# Patient Record
Sex: FEMALE | Race: WHITE
[De-identification: names, ages, dates, MRNs, and addresses within clinical notes are randomized per-mention and may not be internally consistent; named-entity substitution may affect disease eponyms.]

---

## 2019-08-14 ENCOUNTER — HOSPITAL ENCOUNTER (EMERGENCY)
Dept: HOSPITAL 11 - JP.ED | Age: 29
Discharge: NURSING, REHAB OR LONG-TERM CARE | End: 2019-08-14
Payer: MEDICAID

## 2019-08-14 VITALS — HEART RATE: 113 BPM | SYSTOLIC BLOOD PRESSURE: 124 MMHG | DIASTOLIC BLOOD PRESSURE: 69 MMHG

## 2019-08-14 DIAGNOSIS — F15.129: Primary | ICD-10-CM

## 2019-08-14 PROCEDURE — 93005 ELECTROCARDIOGRAM TRACING: CPT

## 2019-08-14 PROCEDURE — 84100 ASSAY OF PHOSPHORUS: CPT

## 2019-08-14 PROCEDURE — 83690 ASSAY OF LIPASE: CPT

## 2019-08-14 PROCEDURE — G0480 DRUG TEST DEF 1-7 CLASSES: HCPCS

## 2019-08-14 PROCEDURE — 31500 INSERT EMERGENCY AIRWAY: CPT

## 2019-08-14 PROCEDURE — 83605 ASSAY OF LACTIC ACID: CPT

## 2019-08-14 PROCEDURE — 80053 COMPREHEN METABOLIC PANEL: CPT

## 2019-08-14 PROCEDURE — 80305 DRUG TEST PRSMV DIR OPT OBS: CPT

## 2019-08-14 PROCEDURE — 71045 X-RAY EXAM CHEST 1 VIEW: CPT

## 2019-08-14 PROCEDURE — 85025 COMPLETE CBC W/AUTO DIFF WBC: CPT

## 2019-08-14 PROCEDURE — 85610 PROTHROMBIN TIME: CPT

## 2019-08-14 PROCEDURE — 84484 ASSAY OF TROPONIN QUANT: CPT

## 2019-08-14 PROCEDURE — 51702 INSERT TEMP BLADDER CATH: CPT

## 2019-08-14 PROCEDURE — 96365 THER/PROPH/DIAG IV INF INIT: CPT

## 2019-08-14 PROCEDURE — 96372 THER/PROPH/DIAG INJ SC/IM: CPT

## 2019-08-14 PROCEDURE — 36415 COLL VENOUS BLD VENIPUNCTURE: CPT

## 2019-08-14 PROCEDURE — 81025 URINE PREGNANCY TEST: CPT

## 2019-08-14 PROCEDURE — 83735 ASSAY OF MAGNESIUM: CPT

## 2019-08-14 PROCEDURE — 81001 URINALYSIS AUTO W/SCOPE: CPT

## 2019-08-14 PROCEDURE — 99285 EMERGENCY DEPT VISIT HI MDM: CPT

## 2019-08-14 PROCEDURE — 96361 HYDRATE IV INFUSION ADD-ON: CPT

## 2019-08-14 NOTE — ANES
DATE OF SERVICE:  08/14/2019

 

I was called to the emergency room to evaluate Ms. Mckeon for an intubation.  She is here

for an apparent overdose and needs airway protection.  I did go ahead and give her 200 mg of

propofol, and the emergency room doctor performed the intubation with a GlideScope with an

8.0 endotracheal tube.  She tolerated the procedure very nicely.  She is quite obtunded,

although she could not open her mouth without the Diprivan.

 

 

 

 

Anand Fernandes CRNA

DD:  08/14/2019 02:44:42

DT:  08/14/2019 03:13:25

Job #:  [id number]

## 2019-08-14 NOTE — EDM.PDOC
ED HPI GENERAL MEDICAL PROBLEM





- General


Chief Complaint: General


Stated Complaint: MEDICAL


Time Seen by Provider: 08/14/19 00:05


Source of Information: Reports: Other (Boyfriend)


History Limitations: Reports: Altered Mental Status





- History of Present Illness


INITIAL COMMENTS - FREE TEXT/NARRATIVE: 





28 years old female patient brought in by her partner for altered mental 

status. He stated that she was fine when he went to bed then a few hours later 

woke up because of out of noise and she was running around in the house 

thrashing and stumbling and thinks. He brought her for evaluation. She said she 

drinks some alcohol but denies using drugs. He is not sure what had been. He 

stated that she doesn't use drugs. No report of any trauma or head injury. No 

report of any chest pain or shortness breath. She is agitated, intoxicated and 

not able to review her system or collect clear history from her.





- Related Data


 Allergies











Allergy/AdvReac Type Severity Reaction Status Date / Time


 


No Known Allergies Allergy   Verified 06/29/15 23:59











Home Meds: 


 Home Meds





. [Unable to Verify Home Med List]  08/14/19 [History]











Past Medical History





- Past Health History


Medical/Surgical History: Denies Medical/Surgical History


Other OB/GYN History: cervical conization.  fiberous breasts





Social & Family History





- Living Situation & Occupation


Living situation: Reports: Single, with Significant Other


Occupation: Employed





ED ROS GENERAL





- Review of Systems


Review Of Systems: Unable To Obtain





ED EXAM, GENERAL





- Physical Exam


Exam: See Below


Exam Limited By: Altered Mental Status


General Appearance: Other (Agitated and combative)


Eye Exam: Bilateral Eye: EOMI


Ears: Normal External Exam, Normal Canal, Hearing Grossly Normal, Normal TMs


Ear Exam: Bilateral Ear: Auricle Normal, Canal Normal, TM normal


Nose: Normal Inspection, Normal Mucosa, No Blood


Throat/Mouth: Normal Inspection, Normal Lips, Normal Teeth, Normal Gums, Normal 

Oropharynx, Normal Voice, No Airway Compromise


Head: Atraumatic, Normocephalic


Respiratory/Chest: No Respiratory Distress, Lungs Clear, Normal Breath Sounds, 

No Accessory Muscle Use, Chest Non-Tender.  No: Crackles, Rales, Rhonchi, 

Wheezing


Cardiovascular: Normal Peripheral Pulses, Tachycardia.  No: Diastolic Murmur, 

Systolic Murmur


GI/Abdominal: Normal Bowel Sounds, Soft, Non-Tender, No Organomegaly, No 

Distention, No Abnormal Bruit, No Mass


Back Exam: Normal Inspection, Full Range of Motion, NT


Neurological: Inattentive, Disoriented


Skin Exam: Diaphoretic





Course





- Vital Signs


Last Recorded V/S: 


 Last Vital Signs











Temp  36.4 C   08/14/19 02:48


 


Pulse  114 H  08/14/19 02:48


 


Resp  15   08/14/19 02:48


 


BP  123/79   08/14/19 02:48


 


Pulse Ox  100   08/14/19 02:48














- Orders/Labs/Meds


Orders: 


 Active Orders 24 hr











 Category Date Time Status


 


 Cardiac Monitoring [RC] .As Directed Care  08/14/19 00:40 Active


 


 Cardiac Monitoring [RC] .As Directed Care  08/14/19 02:13 Active


 


 EKG Documentation Completion [RC] ASDIRECTED Care  08/14/19 02:13 Active


 


 RASS Sedation Scale [RC] ASDIRECTED Care  08/14/19 02:44 Active


 


 Propofol [Diprivan 100 ML] 100 ml Med  08/14/19 02:45 Active





 IV TITRATE   


 


 Desired Level of Sedation (RASS) [AST] Click to Edit Oth  08/14/19 02:44 

Ordered


 


 EKG 12 Lead [EK] Stat Ther  08/14/19 02:13 Ordered








 Medication Orders





Propofol (Diprivan 100 Ml)  100 mls @ 2.177 mls/hr IV TITRATE TOSIN; Protocol


   Last Titration: 08/14/19 03:06  Dose: 45 mcg/kg/min, 19.595 mls/hr


   Titration: 08/14/19 03:00  Dose: 35 mcg/kg/min, 15.241 mls/hr


   Titration: 08/14/19 02:54  Dose: 25 mcg/kg/min, 10.886 mls/hr


   Admin: 08/14/19 02:50  Dose: 20 mcg/kg/min, 8.709 mls/hr








Labs: 


 Laboratory Tests











  08/14/19 08/14/19 08/14/19 Range/Units





  00:40 00:40 00:40 


 


WBC  8.6    (4.5-11.0)  K/uL


 


RBC  4.58    (3.30-5.50)  M/uL


 


Hgb  14.3    (12.0-15.0)  g/dL


 


Hct  43.2    (36.0-48.0)  %


 


MCV  94    (80-98)  fL


 


MCH  31    (27-31)  pg


 


MCHC  33    (32-36)  %


 


Plt Count  252    (150-400)  K/uL


 


Neut % (Auto)  71 H    (36-66)  %


 


Lymph % (Auto)  17 L    (24-44)  %


 


Mono % (Auto)  10 H    (2-6)  %


 


Eos % (Auto)  1 L    (2-4)  %


 


Baso % (Auto)  1    (0-1)  %


 


PT   10.8   (9.5-12.0)  sec


 


INR   1.00   (0.80-1.20)  


 


Sodium    139 L  (140-148)  mmol/L


 


Potassium    4.0  (3.6-5.2)  mmol/L


 


Chloride    102  (100-108)  mmol/L


 


Carbon Dioxide    26  (21-32)  mmol/L


 


Anion Gap    15.0 H  (5.0-14.0)  mmol/L


 


BUN    15  (7-18)  mg/dL


 


Creatinine    1.0  (0.6-1.0)  mg/dL


 


Est Cr Clr Drug Dosing    82.98  mL/min


 


Estimated GFR (MDRD)    > 60  (>60)  


 


Glucose    118 H  ()  mg/dL


 


Lactic Acid     (0.4-2.0)  mmol/L


 


Calcium    9.1  (8.5-10.1)  mg/dL


 


Phosphorus    4.3  (2.5-4.9)  mg/dL


 


Magnesium    1.6 L  (1.8-2.4)  mg/dL


 


Total Bilirubin    0.8  (0.2-1.0)  mg/dL


 


AST    18  (15-37)  U/L


 


ALT    27  (12-78)  U/L


 


Alkaline Phosphatase    61  ()  U/L


 


Troponin I    < 0.017  (0.000-0.056)  ng/mL


 


Total Protein    7.9  (6.4-8.2)  g/dL


 


Albumin    4.3  (3.4-5.0)  g/dL


 


Globulin    3.6 H  (2.3-3.5)  g/dL


 


Albumin/Globulin Ratio    1.2  (1.2-2.2)  


 


Lipase    84  ()  U/L


 


Urine Color     (YELLOW)  


 


Urine Appearance     (CLEAR)  


 


Urine pH     (5.0-8.0)  


 


Ur Specific Gravity     (1.008-1.030)  


 


Urine Protein     (NEGATIVE)  mg/dL


 


Urine Glucose (UA)     (NEGATIVE)  mg/dL


 


Urine Ketones     (NEGATIVE)  mg/dL


 


Urine Occult Blood     (NEGATIVE)  


 


Urine Nitrite     (NEGATIVE)  


 


Urine Bilirubin     (NEGATIVE)  


 


Urine Urobilinogen     (0.2-1.0)  EU/dL


 


Ur Leukocyte Esterase     (NEGATIVE)  


 


Urine RBC     (0-5)  


 


Urine WBC     (0-5)  


 


Ur Epithelial Cells     


 


Amorphous Sediment     


 


Urine Bacteria     


 


Urine Mucus     


 


Urine HCG, Qual     


 


Urine Opiates Screen     (NEGATIVE)  


 


Ur Oxycodone Screen     (NEGATIVE)  


 


Urine Methadone Screen     (NEGATIVE)  


 


Ur Propoxyphene Screen     (NEGATIVE)  


 


Ur Barbiturates Screen     (NEGATIVE)  


 


Ur Tricyclics Screen     (NEGATIVE)  


 


Ur Phencyclidine Scrn     (NEGATIVE)  


 


Ur Amphetamine Screen     (NEGATIVE)  


 


U Methamphetamines Scrn     (NEGATIVE)  


 


Urine MDMA Screen     (NEGATIVE)  


 


U Benzodiazepines Scrn     (NEGATIVE)  


 


U Cocaine Metab Screen     (NEGATIVE)  


 


U Marijuana (THC) Screen     (NEGATIVE)  


 


Ethyl Alcohol     mg/dL














  08/14/19 08/14/19 08/14/19 Range/Units





  00:40 00:40 01:30 


 


WBC     (4.5-11.0)  K/uL


 


RBC     (3.30-5.50)  M/uL


 


Hgb     (12.0-15.0)  g/dL


 


Hct     (36.0-48.0)  %


 


MCV     (80-98)  fL


 


MCH     (27-31)  pg


 


MCHC     (32-36)  %


 


Plt Count     (150-400)  K/uL


 


Neut % (Auto)     (36-66)  %


 


Lymph % (Auto)     (24-44)  %


 


Mono % (Auto)     (2-6)  %


 


Eos % (Auto)     (2-4)  %


 


Baso % (Auto)     (0-1)  %


 


PT     (9.5-12.0)  sec


 


INR     (0.80-1.20)  


 


Sodium     (140-148)  mmol/L


 


Potassium     (3.6-5.2)  mmol/L


 


Chloride     (100-108)  mmol/L


 


Carbon Dioxide     (21-32)  mmol/L


 


Anion Gap     (5.0-14.0)  mmol/L


 


BUN     (7-18)  mg/dL


 


Creatinine     (0.6-1.0)  mg/dL


 


Est Cr Clr Drug Dosing     mL/min


 


Estimated GFR (MDRD)     (>60)  


 


Glucose     ()  mg/dL


 


Lactic Acid  1.9    (0.4-2.0)  mmol/L


 


Calcium     (8.5-10.1)  mg/dL


 


Phosphorus     (2.5-4.9)  mg/dL


 


Magnesium     (1.8-2.4)  mg/dL


 


Total Bilirubin     (0.2-1.0)  mg/dL


 


AST     (15-37)  U/L


 


ALT     (12-78)  U/L


 


Alkaline Phosphatase     ()  U/L


 


Troponin I     (0.000-0.056)  ng/mL


 


Total Protein     (6.4-8.2)  g/dL


 


Albumin     (3.4-5.0)  g/dL


 


Globulin     (2.3-3.5)  g/dL


 


Albumin/Globulin Ratio     (1.2-2.2)  


 


Lipase     ()  U/L


 


Urine Color    Yellow  (YELLOW)  


 


Urine Appearance    Clear  (CLEAR)  


 


Urine pH    6.0  (5.0-8.0)  


 


Ur Specific Gravity    1.030  (1.008-1.030)  


 


Urine Protein    Trace H  (NEGATIVE)  mg/dL


 


Urine Glucose (UA)    Normal  (NEGATIVE)  mg/dL


 


Urine Ketones    Negative  (NEGATIVE)  mg/dL


 


Urine Occult Blood    Negative  (NEGATIVE)  


 


Urine Nitrite    Negative  (NEGATIVE)  


 


Urine Bilirubin    Negative  (NEGATIVE)  


 


Urine Urobilinogen    Normal  (0.2-1.0)  EU/dL


 


Ur Leukocyte Esterase    Negative  (NEGATIVE)  


 


Urine RBC    0-5  (0-5)  


 


Urine WBC    0-5  (0-5)  


 


Ur Epithelial Cells    Few  


 


Amorphous Sediment    Few  


 


Urine Bacteria    Few  


 


Urine Mucus    Numerous  


 


Urine HCG, Qual     


 


Urine Opiates Screen     (NEGATIVE)  


 


Ur Oxycodone Screen     (NEGATIVE)  


 


Urine Methadone Screen     (NEGATIVE)  


 


Ur Propoxyphene Screen     (NEGATIVE)  


 


Ur Barbiturates Screen     (NEGATIVE)  


 


Ur Tricyclics Screen     (NEGATIVE)  


 


Ur Phencyclidine Scrn     (NEGATIVE)  


 


Ur Amphetamine Screen     (NEGATIVE)  


 


U Methamphetamines Scrn     (NEGATIVE)  


 


Urine MDMA Screen     (NEGATIVE)  


 


U Benzodiazepines Scrn     (NEGATIVE)  


 


U Cocaine Metab Screen     (NEGATIVE)  


 


U Marijuana (THC) Screen     (NEGATIVE)  


 


Ethyl Alcohol   < 3   mg/dL














  08/14/19 08/14/19 Range/Units





  01:30 01:30 


 


WBC    (4.5-11.0)  K/uL


 


RBC    (3.30-5.50)  M/uL


 


Hgb    (12.0-15.0)  g/dL


 


Hct    (36.0-48.0)  %


 


MCV    (80-98)  fL


 


MCH    (27-31)  pg


 


MCHC    (32-36)  %


 


Plt Count    (150-400)  K/uL


 


Neut % (Auto)    (36-66)  %


 


Lymph % (Auto)    (24-44)  %


 


Mono % (Auto)    (2-6)  %


 


Eos % (Auto)    (2-4)  %


 


Baso % (Auto)    (0-1)  %


 


PT    (9.5-12.0)  sec


 


INR    (0.80-1.20)  


 


Sodium    (140-148)  mmol/L


 


Potassium    (3.6-5.2)  mmol/L


 


Chloride    (100-108)  mmol/L


 


Carbon Dioxide    (21-32)  mmol/L


 


Anion Gap    (5.0-14.0)  mmol/L


 


BUN    (7-18)  mg/dL


 


Creatinine    (0.6-1.0)  mg/dL


 


Est Cr Clr Drug Dosing    mL/min


 


Estimated GFR (MDRD)    (>60)  


 


Glucose    ()  mg/dL


 


Lactic Acid    (0.4-2.0)  mmol/L


 


Calcium    (8.5-10.1)  mg/dL


 


Phosphorus    (2.5-4.9)  mg/dL


 


Magnesium    (1.8-2.4)  mg/dL


 


Total Bilirubin    (0.2-1.0)  mg/dL


 


AST    (15-37)  U/L


 


ALT    (12-78)  U/L


 


Alkaline Phosphatase    ()  U/L


 


Troponin I    (0.000-0.056)  ng/mL


 


Total Protein    (6.4-8.2)  g/dL


 


Albumin    (3.4-5.0)  g/dL


 


Globulin    (2.3-3.5)  g/dL


 


Albumin/Globulin Ratio    (1.2-2.2)  


 


Lipase    ()  U/L


 


Urine Color    (YELLOW)  


 


Urine Appearance    (CLEAR)  


 


Urine pH    (5.0-8.0)  


 


Ur Specific Gravity    (1.008-1.030)  


 


Urine Protein    (NEGATIVE)  mg/dL


 


Urine Glucose (UA)    (NEGATIVE)  mg/dL


 


Urine Ketones    (NEGATIVE)  mg/dL


 


Urine Occult Blood    (NEGATIVE)  


 


Urine Nitrite    (NEGATIVE)  


 


Urine Bilirubin    (NEGATIVE)  


 


Urine Urobilinogen    (0.2-1.0)  EU/dL


 


Ur Leukocyte Esterase    (NEGATIVE)  


 


Urine RBC    (0-5)  


 


Urine WBC    (0-5)  


 


Ur Epithelial Cells    


 


Amorphous Sediment    


 


Urine Bacteria    


 


Urine Mucus    


 


Urine HCG, Qual  Negative   


 


Urine Opiates Screen   Negative  (NEGATIVE)  


 


Ur Oxycodone Screen   Negative  (NEGATIVE)  


 


Urine Methadone Screen   Negative  (NEGATIVE)  


 


Ur Propoxyphene Screen   Negative  (NEGATIVE)  


 


Ur Barbiturates Screen   Negative  (NEGATIVE)  


 


Ur Tricyclics Screen   Negative  (NEGATIVE)  


 


Ur Phencyclidine Scrn   Negative  (NEGATIVE)  


 


Ur Amphetamine Screen   Presumptive positive H  (NEGATIVE)  


 


U Methamphetamines Scrn   Presumptive positive H  (NEGATIVE)  


 


Urine MDMA Screen   Presumptive positive H  (NEGATIVE)  


 


U Benzodiazepines Scrn   Negative  (NEGATIVE)  


 


U Cocaine Metab Screen   Negative  (NEGATIVE)  


 


U Marijuana (THC) Screen   Presumptive positive H  (NEGATIVE)  


 


Ethyl Alcohol    mg/dL











Meds: 


Medications











Generic Name Dose Route Start Last Admin





  Trade Name Freq  PRN Reason Stop Dose Admin


 


Propofol  100 mls @ 2.177 mls/hr  08/14/19 02:45  08/14/19 03:06





  Diprivan 100 Ml  IV   45 mcg/kg/min





  TITRATE TOSIN   19.595 mls/hr





     Titration





     





  Protocol   





  5 MCG/KG/MIN   














Discontinued Medications














Generic Name Dose Route Start Last Admin





  Trade Name Freq  PRN Reason Stop Dose Admin


 


Sodium Chloride  1,000 mls @ 999 mls/hr  08/14/19 00:42  08/14/19 00:50





  Normal Saline  IV  08/14/19 01:42  999 mls/hr





  .BOLUS STA   Administration





     





     





     





     


 


Propofol  Confirm  08/14/19 02:42  08/14/19 02:51





  Diprivan 100 Ml  Administered  08/14/19 02:43  Not Given





  Dose   





  100 mls @ as directed   





  .ROUTE   





  .STK-MED ONE   





     





     





     





     


 


Ketamine HCl  350 mg  08/14/19 00:37  08/14/19 01:34





  Ketalar  IM  08/14/19 00:38  350 mg





  ONETIME ONE   Administration





     





     





     





     


 


Ketamine HCl  Confirm  08/14/19 00:37  08/14/19 02:51





  Ketalar  Administered  08/14/19 00:38  Not Given





  Dose   





  500 mg   





  .ROUTE   





  .STK-MED ONE   





     





     





     





     


 


Ketamine HCl  250 mg  08/14/19 01:10  08/14/19 01:34





  Ketalar  IM  08/14/19 01:11  150 mg





  ONETIME ONE   Administration





     





     





     





     


 


Propofol  Confirm  08/14/19 02:47  





  Diprivan  20 Ml  Administered  08/14/19 02:48  





  Dose   





  200 mg   





  .ROUTE   





  .STK-MED ONE   





     





     





     





     














- Radiology Interpretation


Free Text/Narrative:: 





Patient was seen and examined immediately on arrival. She appears intoxicated 

very agitated and combative. Was given 500 IM ketamine, lab reviewed. Her urine 

shows positive for amphetamine, methamphetamine, marijuana, MDMA. Given 2 L 

normal saline bolus. EKG shows sinus tachycardia. Blood pressure has been 

stable. Continued to be combative and agitated. Decision was made to intubate 

her. We do not have ICU beds. I did consulted with ICU physician intensivist 

Dr. Krunal fish of [place] and she accepted the transfer for 

further management. she is currently currently intubated and sedated. 

ambulance. Here and she is related ago so CT head was not done not to delay 

transfer. Stable for transfer.





Departure





- Departure


Time of Disposition: 02:25


Disposition: DC/Tfer to [facility]


Condition: Good, Fair


Clinical Impression: 


 Methamphetamine intoxication








- Discharge Information


Referrals: 


PCP,None [Primary Care Provider] - 


Forms:  ED Department Discharge





- My Orders


Last 24 Hours: 


My Active Orders





08/14/19 00:40


Cardiac Monitoring [RC] .As Directed 





08/14/19 02:13


Cardiac Monitoring [RC] .As Directed 


EKG Documentation Completion [RC] ASDIRECTED 


EKG 12 Lead [EK] Stat 





08/14/19 02:44


RASS Sedation Scale [RC] ASDIRECTED 


Desired Level of Sedation (RASS) [AST] Click to Edit 





08/14/19 02:45


Propofol [Diprivan 100 ML] 100 ml IV TITRATE 














- Assessment/Plan


Last 24 Hours: 


My Active Orders





08/14/19 00:40


Cardiac Monitoring [RC] .As Directed 





08/14/19 02:13


Cardiac Monitoring [RC] .As Directed 


EKG Documentation Completion [RC] ASDIRECTED 


EKG 12 Lead [EK] Stat 





08/14/19 02:44


RASS Sedation Scale [RC] ASDIRECTED 


Desired Level of Sedation (RASS) [AST] Click to Edit 





08/14/19 02:45


Propofol [Diprivan 100 ML] 100 ml IV TITRATE 











Plan: 





Transfer to [place]

## 2019-08-14 NOTE — CRLCR
Indication:



Intubated



Technique:



Chest 1 view



Comparison:



None



Findings/Impression:



Endotracheal tube tips terminates 3.9 cm above the level of the sherwin. 

Normal cardiomediastinal silhouette. Lungs and pleural spaces are clear. No 

acute osseous abnormality.



Dictated by Suzy Betancourt MD @ Aug 14 2019  3:03AM



Signed by Dr. Suzy Betancourt @ Aug 14 2019  3:03AM

## 2019-11-22 ENCOUNTER — HOSPITAL ENCOUNTER (OUTPATIENT)
Dept: HOSPITAL 11 - JP.SDS | Age: 29
Discharge: HOME | End: 2019-11-22
Attending: SURGERY
Payer: MEDICAID

## 2019-11-22 VITALS — HEART RATE: 97 BPM | SYSTOLIC BLOOD PRESSURE: 123 MMHG | DIASTOLIC BLOOD PRESSURE: 65 MMHG

## 2019-11-22 DIAGNOSIS — Z53.9: ICD-10-CM

## 2019-11-22 DIAGNOSIS — K81.1: Primary | ICD-10-CM

## 2019-11-22 PROCEDURE — 80305 DRUG TEST PRSMV DIR OPT OBS: CPT

## 2022-03-22 ENCOUNTER — HOSPITAL ENCOUNTER (EMERGENCY)
Dept: HOSPITAL 11 - JP.ED | Age: 32
Discharge: HOME | End: 2022-03-22
Payer: MEDICAID

## 2022-03-22 VITALS — SYSTOLIC BLOOD PRESSURE: 139 MMHG | HEART RATE: 101 BPM | DIASTOLIC BLOOD PRESSURE: 79 MMHG

## 2022-03-22 DIAGNOSIS — R07.89: Primary | ICD-10-CM

## 2023-03-05 ENCOUNTER — HOSPITAL ENCOUNTER (EMERGENCY)
Dept: HOSPITAL 11 - JP.ED | Age: 33
Discharge: HOME | End: 2023-03-05
Payer: MEDICAID

## 2023-03-05 VITALS — HEART RATE: 100 BPM | DIASTOLIC BLOOD PRESSURE: 87 MMHG | SYSTOLIC BLOOD PRESSURE: 154 MMHG

## 2023-03-05 DIAGNOSIS — F41.0: Primary | ICD-10-CM

## 2023-03-05 DIAGNOSIS — Z72.0: ICD-10-CM

## 2023-03-05 LAB — TROPONIN I SERPL HS-MCNC: 8.2 PG/ML (ref ?–60.3)

## 2023-03-05 PROCEDURE — 85025 COMPLETE CBC W/AUTO DIFF WBC: CPT

## 2023-03-05 PROCEDURE — 93005 ELECTROCARDIOGRAM TRACING: CPT

## 2023-03-05 PROCEDURE — 96372 THER/PROPH/DIAG INJ SC/IM: CPT

## 2023-03-05 PROCEDURE — 83605 ASSAY OF LACTIC ACID: CPT

## 2023-03-05 PROCEDURE — 80053 COMPREHEN METABOLIC PANEL: CPT

## 2023-03-05 PROCEDURE — 99285 EMERGENCY DEPT VISIT HI MDM: CPT

## 2023-03-05 PROCEDURE — 36415 COLL VENOUS BLD VENIPUNCTURE: CPT

## 2023-03-05 PROCEDURE — 71046 X-RAY EXAM CHEST 2 VIEWS: CPT

## 2023-03-05 PROCEDURE — 84484 ASSAY OF TROPONIN QUANT: CPT

## 2023-04-03 ENCOUNTER — HOSPITAL ENCOUNTER (EMERGENCY)
Dept: HOSPITAL 11 - JP.ED | Age: 33
Discharge: HOME | End: 2023-04-03
Payer: MEDICAID

## 2023-04-03 VITALS — DIASTOLIC BLOOD PRESSURE: 76 MMHG | SYSTOLIC BLOOD PRESSURE: 130 MMHG | HEART RATE: 108 BPM

## 2023-04-03 DIAGNOSIS — F17.210: ICD-10-CM

## 2023-04-03 DIAGNOSIS — U07.1: Primary | ICD-10-CM

## 2023-04-03 LAB — SARS-COV-2 RNA RESP QL NAA+PROBE: POSITIVE

## 2023-04-03 PROCEDURE — 81001 URINALYSIS AUTO W/SCOPE: CPT

## 2023-04-03 PROCEDURE — 36415 COLL VENOUS BLD VENIPUNCTURE: CPT

## 2023-04-03 PROCEDURE — 96372 THER/PROPH/DIAG INJ SC/IM: CPT

## 2023-04-03 PROCEDURE — 80053 COMPREHEN METABOLIC PANEL: CPT

## 2023-04-03 PROCEDURE — 86140 C-REACTIVE PROTEIN: CPT

## 2023-04-03 PROCEDURE — 85025 COMPLETE CBC W/AUTO DIFF WBC: CPT

## 2023-04-03 PROCEDURE — 99283 EMERGENCY DEPT VISIT LOW MDM: CPT

## 2023-04-03 PROCEDURE — 0241U: CPT

## 2023-05-09 ENCOUNTER — HOSPITAL ENCOUNTER (EMERGENCY)
Dept: HOSPITAL 11 - JP.ED | Age: 33
Discharge: HOME | End: 2023-05-09
Payer: MEDICAID

## 2023-05-09 VITALS — DIASTOLIC BLOOD PRESSURE: 88 MMHG | HEART RATE: 110 BPM | SYSTOLIC BLOOD PRESSURE: 144 MMHG

## 2023-05-09 DIAGNOSIS — M76.61: Primary | ICD-10-CM

## 2023-05-09 DIAGNOSIS — Z72.0: ICD-10-CM

## 2023-05-09 LAB
BASOPHILS # BLD AUTO: 0.08 K/UL (ref 0–0.1)
BASOPHILS NFR BLD AUTO: 0.8 % (ref 0.1–1.3)
EOSINOPHIL # BLD AUTO: 0.21 K/UL (ref 0–0.4)
EOSINOPHIL NFR BLD AUTO: 2 % (ref 0–5.4)
HCT VFR BLD AUTO: 39.4 % (ref 34.3–46)
HGB BLD-MCNC: 13 G/DL (ref 11.2–15.5)
IMM GRANULOCYTES # BLD: 0.04 K/UL (ref 0–0.23)
IMM GRANULOCYTES NFR BLD: 0.4 % (ref 0–0.7)
LYMPHOCYTES # BLD AUTO: 1.27 K/UL (ref 0.8–3.3)
LYMPHOCYTES NFR BLD AUTO: 12.1 % (ref 11.4–47.7)
MCH RBC QN AUTO: 31.6 PG (ref 31.6–35.5)
MCHC RBC AUTO-ENTMCNC: 33 G/DL (ref 31.6–35.5)
MCHC RBC AUTO-ENTMCNC: 95.6 FL (ref 81.4–99)
MONOCYTES # BLD AUTO: 0.99 K/UL (ref 0.2–0.9)
MONOCYTES NFR BLD AUTO: 9.5 % (ref 3.3–12.6)
NEUTROPHILS # BLD AUTO: 7.88 K/UL (ref 1–7.6)
NEUTROPHILS NFR BLD AUTO: 75.2 % (ref 40–78.1)
PLATELET # BLD AUTO: 244 K/UL (ref 130–375)
RBC # BLD AUTO: 4.12 M/UL (ref 3.77–5.24)
WBC # BLD AUTO: 10.5 K/UL (ref 3.2–11)

## 2023-05-09 PROCEDURE — 85025 COMPLETE CBC W/AUTO DIFF WBC: CPT

## 2023-05-09 PROCEDURE — 86140 C-REACTIVE PROTEIN: CPT

## 2023-05-09 PROCEDURE — 36415 COLL VENOUS BLD VENIPUNCTURE: CPT

## 2023-05-09 PROCEDURE — 85379 FIBRIN DEGRADATION QUANT: CPT

## 2023-05-09 PROCEDURE — 99283 EMERGENCY DEPT VISIT LOW MDM: CPT

## 2023-05-09 PROCEDURE — 96372 THER/PROPH/DIAG INJ SC/IM: CPT

## 2023-09-10 ENCOUNTER — HOSPITAL ENCOUNTER (EMERGENCY)
Dept: HOSPITAL 11 - JP.ED | Age: 33
Discharge: HOME | End: 2023-09-10
Payer: MEDICAID

## 2023-09-10 VITALS — HEART RATE: 103 BPM | DIASTOLIC BLOOD PRESSURE: 89 MMHG | SYSTOLIC BLOOD PRESSURE: 132 MMHG

## 2023-09-10 DIAGNOSIS — F17.210: ICD-10-CM

## 2023-09-10 DIAGNOSIS — S00.03XA: Primary | ICD-10-CM
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
## 2025-05-21 ENCOUNTER — HOSPITAL ENCOUNTER (EMERGENCY)
Dept: HOSPITAL 11 - JP.ED | Age: 35
Discharge: HOME | End: 2025-05-21
Payer: MEDICAID

## 2025-05-21 VITALS — HEART RATE: 116 BPM | DIASTOLIC BLOOD PRESSURE: 84 MMHG | SYSTOLIC BLOOD PRESSURE: 143 MMHG
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
## 2025-05-21 DIAGNOSIS — Z23: ICD-10-CM

## 2025-05-21 DIAGNOSIS — S60.450A: Primary | ICD-10-CM

## 2025-05-21 DIAGNOSIS — Z79.899: ICD-10-CM

## 2025-05-21 PROCEDURE — 90471 IMMUNIZATION ADMIN: CPT

## 2025-05-21 PROCEDURE — 90715 TDAP VACCINE 7 YRS/> IM: CPT

## 2025-05-21 PROCEDURE — 99283 EMERGENCY DEPT VISIT LOW MDM: CPT

## 2025-05-21 RX ADMIN — TETANUS TOXOID, REDUCED DIPHTHERIA TOXOID AND ACELLULAR PERTUSSIS VACCINE, ADSORBED ONE ML: 5; 2.5; 8; 8; 2.5 SUSPENSION INTRAMUSCULAR at 19:50
